# Patient Record
Sex: FEMALE | Race: WHITE
[De-identification: names, ages, dates, MRNs, and addresses within clinical notes are randomized per-mention and may not be internally consistent; named-entity substitution may affect disease eponyms.]

---

## 2018-10-09 ENCOUNTER — HOSPITAL ENCOUNTER (EMERGENCY)
Dept: HOSPITAL 25 - UCCORT | Age: 43
Discharge: HOME | End: 2018-10-09
Payer: COMMERCIAL

## 2018-10-09 VITALS — SYSTOLIC BLOOD PRESSURE: 132 MMHG | DIASTOLIC BLOOD PRESSURE: 72 MMHG

## 2018-10-09 DIAGNOSIS — Z88.0: ICD-10-CM

## 2018-10-09 DIAGNOSIS — M26.601: Primary | ICD-10-CM

## 2018-10-09 PROCEDURE — G0463 HOSPITAL OUTPT CLINIC VISIT: HCPCS

## 2018-10-09 PROCEDURE — 99212 OFFICE O/P EST SF 10 MIN: CPT

## 2018-10-09 NOTE — UC
Ear Complaint HPI





- HPI Summary


HPI Summary: 





right ear pain x 2 days


pain is sever, 7 out of 10 , pain radiates to her right jaw


increase pain with chewing, better with rest, 


no cold symptoms , no dental pain , no hearing loss


no fever, no chills


hx of TMJ





- History of Current Complaint


Chief Complaint: UCEar


Stated Complaint: EARS,SORE THROAT


Time Seen by Provider: 10/09/18 11:15


Hx Obtained From: Patient


Hx Last Menstrual Period: 10/04/18


Pregnant?: No


Onset/Duration: Gradual Onset, Lasting Days - 2, Still Present


Severity Initially: Moderate


Severity Currently: Severe


Pain Intensity: 6


Aggravating Factors: Other - chewing


Alleviating Factors: Other (Noted In Comments) - rest,


Related History: Other (Noted In Comments) - hx of TMJ





- Allergies/Home Medications


Allergies/Adverse Reactions: 


 Allergies











Allergy/AdvReac Type Severity Reaction Status Date / Time


 


Penicillins Allergy  Rash Verified 10/09/18 11:12














PMH/Surg Hx/FS Hx/Imm Hx





- Additional Past Medical History


Additional PMH: 





TMJ


Psychological History: Anxiety





- Surgical History


Surgical History: Yes


Surgery Procedure, Year, and Place: , 2004 2006 , [facility].  breast 

reduction 2016





- Family History


Known Family History: 


   Negative: Diabetes





- Social History


Alcohol Use: Rare


Substance Use Type: None


Smoking Status (MU): Never Smoked Tobacco





- Immunization History


Most Recent Influenza Vaccination: no





Review of Systems


Constitutional: Negative


Skin: Negative


Eyes: Negative


ENT: Negative


Respiratory: Negative


Is Patient Immunocompromised?: No


All Other Systems Reviewed And Are Negative: Yes





Physical Exam


Triage Information Reviewed: Yes


Appearance: Well-Appearing, No Pain Distress, Well-Nourished


Vital Signs: 


 Initial Vital Signs











Temp  98 F   10/09/18 11:08


 


Pulse  80   10/09/18 11:08


 


Resp  14   10/09/18 11:08


 


BP  132/72   10/09/18 11:08


 


Pulse Ox  100   10/09/18 11:08











Vital Signs Reviewed: Yes


Eye Exam: Normal


Eyes: Positive: Conjunctiva Clear


ENT: Positive: Normal ENT inspection, Hearing grossly normal, Pharynx normal, 

TMs normal, Other - tenderness right TMJ , very limited ROM of the Jaw.  

Negative: Pharyngeal erythema, Nasal congestion, Nasal drainage, TM bulging, TM 

dull, TM red, Tonsillar swelling, Tonsillar exudate


Respiratory: Positive: Chest non-tender, Lungs clear, Normal breath sounds


Cardiovascular: Positive: RRR, No Murmur, Pulses Normal


Skin Exam: Normal





Ear Complaint Course/Dx





- Differential Dx/Diagnosis


Provider Diagnoses: right TMJ





Discharge





- Sign-Out/Discharge


Documenting (check all that apply): Patient Departure


All imaging exams completed and their final reports reviewed: No Studies





- Discharge Plan


Condition: Stable


Disposition: HOME


Prescriptions: 


Fluticasone NASAL SPRAY 50MCG* [Flonase NASAL SPRAY 50MCG*] 2 spray BOTH NARES 

DAILY #1 btl


Naproxen [Naproxen 500 mg tab] 500 mg PO BID #20 tablet


Patient Education Materials:  Temporomandibular Disorder (ED), Earache (ED)


Referrals: 


No Primary Care Phys,NOPCP [Primary Care Provider] - 7 Days





- Billing Disposition and Condition


Condition: STABLE


Disposition: Home